# Patient Record
Sex: FEMALE | Race: WHITE | NOT HISPANIC OR LATINO | URBAN - METROPOLITAN AREA
[De-identification: names, ages, dates, MRNs, and addresses within clinical notes are randomized per-mention and may not be internally consistent; named-entity substitution may affect disease eponyms.]

---

## 2023-10-02 ENCOUNTER — OFFICE (OUTPATIENT)
Dept: URBAN - METROPOLITAN AREA CLINIC 12 | Facility: CLINIC | Age: 48
End: 2023-10-02

## 2023-10-02 VITALS
OXYGEN SATURATION: 98 % | HEIGHT: 63 IN | HEART RATE: 120 BPM | SYSTOLIC BLOOD PRESSURE: 156 MMHG | DIASTOLIC BLOOD PRESSURE: 108 MMHG | WEIGHT: 107 LBS

## 2023-10-02 DIAGNOSIS — R15.2 FECAL URGENCY: ICD-10-CM

## 2023-10-02 DIAGNOSIS — R10.13 EPIGASTRIC PAIN: ICD-10-CM

## 2023-10-02 DIAGNOSIS — R63.4 ABNORMAL WEIGHT LOSS: ICD-10-CM

## 2023-10-02 PROCEDURE — 99205 OFFICE O/P NEW HI 60 MIN: CPT | Performed by: INTERNAL MEDICINE

## 2023-10-02 NOTE — SERVICEHPINOTES
Patient is a 48-year-old  woman with history of anxiety ( utilizes medical marijuana gummies since 2020),  prior cholecystectomy,  who presents for evaluation of fecal urgency, and nausea that began May 3rd.  At this time, patient's granddaughter was born, and that is how she remembers when the symptoms began.  Patient has been under some stress, but not anything more than usual.   Her mother-in-law had cancer and passed away a few months ago, and her grandfather is currently passing away in [place].  
gibran leary Patient was admitted to [facility] in mid July, and underwent upper endoscopy and colonoscopy after she had been treated with ciprofloxacin and metronidazole.  Apparently biopsies were all unremarkable, and stool studies as well as cross-sectional imaging was also unremarkable.   Patient's blood work, including thyroid testing, was normal, and she was discharged with Bentyl 4 times a day, and fiber supplementation, which has not helped her.  She subsequently was trialed on pancreatic enzymes empirically starting last week, but this is not given her any significant relief thus far.   
gibran leary Patient does have focal epigastric pain that does not radiate, and is not reproducible on exam.   Patient is now having about 3 bowel movements a day, but this is exacerbated by anything that she eats.  Patient had initially tried Pepto-Bismol without any relief, and loperamide also did not help.  Lomotil  was only temporarily helpful.   At this point patient also feels clammy on her skin, and she has elevated heart rate and blood pressure.  patient does not report any blood in the stool.   Since COVID in May ( she developed COVID  after GI symptoms began), patient reports that her  sense of taste and smell has been altered. 
gibran leary    Patient has been seen at [facility] in 2011, at which time she sigmoidoscopy done, and prior to that she had colonoscopy in 2010 that was suggestive of  colitis, possibly secondary to C difficile.  Subsequent sigmoidoscopy biopsies do not reveal any evidence of colitis

## 2023-10-04 LAB
ANA, IFA RFX 9 MARK MULTIPLEX: ANA BY IFA RFX TITER/PATTERN: NEGATIVE
ANA, IFA RFX 9 MARK MULTIPLEX: PLEASE NOTE: (no result)
C-REACTIVE PROTEIN, QUANT: <1 MG/L
CHROMOGRANIN A: 53.2 NG/ML (ref 0–101.8)
SEDIMENTATION RATE-WESTERGREN: 5 MM/HR (ref 0–40)

## 2023-10-12 LAB
5-HIAA,QUANT.,24 HR URINE: 5-HIAA, URINE, 24HR: < MG/24 HR
5-HIAA,QUANT.,24 HR URINE: 5-HIAA, URINE: <0.1 MG/L

## 2023-10-13 LAB
C DIFFICILE TOXIN GENE NAA: NEGATIVE
CALPROTECTIN, FECAL: 43 UG/G (ref 0–120)
GI PROFILE, STOOL, PCR: ADENOVIRUS F 40/41: NOT DETECTED
GI PROFILE, STOOL, PCR: ASTROVIRUS: NOT DETECTED
GI PROFILE, STOOL, PCR: C DIFFICILE TOXIN A/B: DETECTED
GI PROFILE, STOOL, PCR: CAMPYLOBACTER: NOT DETECTED
GI PROFILE, STOOL, PCR: CRYPTOSPORIDIUM: NOT DETECTED
GI PROFILE, STOOL, PCR: CYCLOSPORA CAYETANENSIS: NOT DETECTED
GI PROFILE, STOOL, PCR: E COLI O157: (no result)
GI PROFILE, STOOL, PCR: ENTAMOEBA HISTOLYTICA: NOT DETECTED
GI PROFILE, STOOL, PCR: ENTEROAGGREGATIVE E COLI: NOT DETECTED
GI PROFILE, STOOL, PCR: ENTEROPATHOGENIC E COLI: NOT DETECTED
GI PROFILE, STOOL, PCR: ENTEROTOXIGENIC E COLI: NOT DETECTED
GI PROFILE, STOOL, PCR: GIARDIA LAMBLIA: NOT DETECTED
GI PROFILE, STOOL, PCR: NOROVIRUS GI/GII: NOT DETECTED
GI PROFILE, STOOL, PCR: PLESIOMONAS SHIGELLOIDES: NOT DETECTED
GI PROFILE, STOOL, PCR: ROTAVIRUS A: NOT DETECTED
GI PROFILE, STOOL, PCR: SALMONELLA: NOT DETECTED
GI PROFILE, STOOL, PCR: SAPOVIRUS: NOT DETECTED
GI PROFILE, STOOL, PCR: SHIGA-TOXIN-PRODUCING E COLI: NOT DETECTED
GI PROFILE, STOOL, PCR: SHIGELLA/ENTEROINVASIVE E COLI: NOT DETECTED
GI PROFILE, STOOL, PCR: VIBRIO CHOLERAE: NOT DETECTED
GI PROFILE, STOOL, PCR: VIBRIO: NOT DETECTED
GI PROFILE, STOOL, PCR: YERSINIA ENTEROCOLITICA: NOT DETECTED
OVA + PARASITE EXAM: (no result)
OVA + PARASITE EXAM: RESULT 1: (no result)
PANCREATIC ELASTASE, FECAL: 291 UG ELAST./G (ref 200–?)

## 2023-10-30 ENCOUNTER — AMBULATORY SURGICAL CENTER (OUTPATIENT)
Dept: URBAN - METROPOLITAN AREA SURGERY 1 | Facility: SURGERY | Age: 48
End: 2023-10-30

## 2023-10-30 ENCOUNTER — AMBULATORY SURGICAL CENTER (OUTPATIENT)
Dept: URBAN - METROPOLITAN AREA SURGERY 1 | Facility: SURGERY | Age: 48
End: 2023-10-30
Payer: COMMERCIAL

## 2023-10-30 ENCOUNTER — OFFICE (OUTPATIENT)
Dept: URBAN - METROPOLITAN AREA PATHOLOGY 12 | Facility: PATHOLOGY | Age: 48
End: 2023-10-30
Payer: COMMERCIAL

## 2023-10-30 VITALS
TEMPERATURE: 97.1 F | OXYGEN SATURATION: 97 % | SYSTOLIC BLOOD PRESSURE: 103 MMHG | OXYGEN SATURATION: 98 % | SYSTOLIC BLOOD PRESSURE: 108 MMHG | RESPIRATION RATE: 18 BRPM | DIASTOLIC BLOOD PRESSURE: 69 MMHG | OXYGEN SATURATION: 100 % | SYSTOLIC BLOOD PRESSURE: 103 MMHG | SYSTOLIC BLOOD PRESSURE: 108 MMHG | SYSTOLIC BLOOD PRESSURE: 91 MMHG | RESPIRATION RATE: 14 BRPM | HEART RATE: 67 BPM | DIASTOLIC BLOOD PRESSURE: 74 MMHG | TEMPERATURE: 97.1 F | HEART RATE: 67 BPM | OXYGEN SATURATION: 100 % | TEMPERATURE: 97.2 F | SYSTOLIC BLOOD PRESSURE: 90 MMHG | HEART RATE: 90 BPM | DIASTOLIC BLOOD PRESSURE: 57 MMHG | HEIGHT: 63 IN | DIASTOLIC BLOOD PRESSURE: 51 MMHG | DIASTOLIC BLOOD PRESSURE: 57 MMHG | WEIGHT: 114 LBS | TEMPERATURE: 97.2 F | RESPIRATION RATE: 19 BRPM | SYSTOLIC BLOOD PRESSURE: 108 MMHG | SYSTOLIC BLOOD PRESSURE: 91 MMHG | OXYGEN SATURATION: 98 % | HEART RATE: 86 BPM | TEMPERATURE: 97.1 F | HEART RATE: 66 BPM | RESPIRATION RATE: 19 BRPM | SYSTOLIC BLOOD PRESSURE: 90 MMHG | DIASTOLIC BLOOD PRESSURE: 69 MMHG | HEART RATE: 86 BPM | DIASTOLIC BLOOD PRESSURE: 51 MMHG | OXYGEN SATURATION: 98 % | HEART RATE: 66 BPM | HEART RATE: 86 BPM | DIASTOLIC BLOOD PRESSURE: 57 MMHG | DIASTOLIC BLOOD PRESSURE: 80 MMHG | SYSTOLIC BLOOD PRESSURE: 103 MMHG | OXYGEN SATURATION: 95 % | TEMPERATURE: 97.2 F | OXYGEN SATURATION: 97 % | HEART RATE: 67 BPM | DIASTOLIC BLOOD PRESSURE: 51 MMHG | SYSTOLIC BLOOD PRESSURE: 91 MMHG | OXYGEN SATURATION: 96 % | OXYGEN SATURATION: 95 % | DIASTOLIC BLOOD PRESSURE: 74 MMHG | OXYGEN SATURATION: 97 % | HEART RATE: 90 BPM | RESPIRATION RATE: 16 BRPM | HEIGHT: 63 IN | WEIGHT: 114 LBS | HEART RATE: 68 BPM | OXYGEN SATURATION: 96 % | WEIGHT: 114 LBS | RESPIRATION RATE: 14 BRPM | HEIGHT: 63 IN | RESPIRATION RATE: 19 BRPM | DIASTOLIC BLOOD PRESSURE: 69 MMHG | HEART RATE: 68 BPM | DIASTOLIC BLOOD PRESSURE: 80 MMHG | RESPIRATION RATE: 18 BRPM | DIASTOLIC BLOOD PRESSURE: 74 MMHG | RESPIRATION RATE: 14 BRPM | OXYGEN SATURATION: 100 % | OXYGEN SATURATION: 95 % | RESPIRATION RATE: 16 BRPM | HEART RATE: 66 BPM | RESPIRATION RATE: 18 BRPM | DIASTOLIC BLOOD PRESSURE: 80 MMHG | HEART RATE: 68 BPM | OXYGEN SATURATION: 96 % | HEART RATE: 90 BPM | RESPIRATION RATE: 16 BRPM | SYSTOLIC BLOOD PRESSURE: 90 MMHG

## 2023-10-30 DIAGNOSIS — K29.50 UNSPECIFIED CHRONIC GASTRITIS WITHOUT BLEEDING: ICD-10-CM

## 2023-10-30 DIAGNOSIS — R10.13 EPIGASTRIC PAIN: ICD-10-CM

## 2023-10-30 DIAGNOSIS — R11.2 NAUSEA WITH VOMITING, UNSPECIFIED: ICD-10-CM

## 2023-10-30 PROCEDURE — 43239 EGD BIOPSY SINGLE/MULTIPLE: CPT | Performed by: INTERNAL MEDICINE

## 2023-10-30 PROCEDURE — 88313 SPECIAL STAINS GROUP 2: CPT | Performed by: PATHOLOGY

## 2023-10-30 PROCEDURE — 88305 TISSUE EXAM BY PATHOLOGIST: CPT | Performed by: PATHOLOGY

## 2023-10-30 PROCEDURE — 88342 IMHCHEM/IMCYTCHM 1ST ANTB: CPT | Performed by: PATHOLOGY

## 2023-11-02 LAB
GASTRO ONE PATHOLOGY: PDF REPORT: (no result)

## 2023-12-01 ENCOUNTER — OFFICE (OUTPATIENT)
Dept: URBAN - METROPOLITAN AREA CLINIC 11 | Facility: CLINIC | Age: 48
End: 2023-12-01

## 2023-12-01 VITALS
OXYGEN SATURATION: 99 % | HEIGHT: 60 IN | DIASTOLIC BLOOD PRESSURE: 85 MMHG | HEART RATE: 96 BPM | SYSTOLIC BLOOD PRESSURE: 128 MMHG | WEIGHT: 117 LBS

## 2023-12-01 DIAGNOSIS — R19.7 DIARRHEA, UNSPECIFIED: ICD-10-CM

## 2023-12-01 DIAGNOSIS — R10.13 EPIGASTRIC PAIN: ICD-10-CM

## 2023-12-01 DIAGNOSIS — A04.72 ENTEROCOLITIS DUE TO CLOSTRIDIUM DIFFICILE, NOT SPECIFIED AS: ICD-10-CM

## 2023-12-01 DIAGNOSIS — R15.2 FECAL URGENCY: ICD-10-CM

## 2023-12-01 PROCEDURE — 99214 OFFICE O/P EST MOD 30 MIN: CPT | Performed by: NURSE PRACTITIONER

## 2023-12-05 LAB — C DIFFICILE TOXINS A+B, EIA: NEGATIVE

## 2024-01-26 ENCOUNTER — OFFICE (OUTPATIENT)
Dept: URBAN - METROPOLITAN AREA CLINIC 11 | Facility: CLINIC | Age: 49
End: 2024-01-26

## 2024-01-26 VITALS
SYSTOLIC BLOOD PRESSURE: 115 MMHG | OXYGEN SATURATION: 98 % | HEIGHT: 63 IN | HEART RATE: 85 BPM | WEIGHT: 117 LBS | DIASTOLIC BLOOD PRESSURE: 81 MMHG

## 2024-01-26 DIAGNOSIS — R11.2 NAUSEA WITH VOMITING, UNSPECIFIED: ICD-10-CM

## 2024-01-26 DIAGNOSIS — R10.13 EPIGASTRIC PAIN: ICD-10-CM

## 2024-01-26 DIAGNOSIS — A04.72 ENTEROCOLITIS DUE TO CLOSTRIDIUM DIFFICILE, NOT SPECIFIED AS: ICD-10-CM

## 2024-01-26 PROCEDURE — 99214 OFFICE O/P EST MOD 30 MIN: CPT | Performed by: INTERNAL MEDICINE

## 2024-02-15 ENCOUNTER — OFFICE (OUTPATIENT)
Dept: URBAN - METROPOLITAN AREA CLINIC 11 | Facility: CLINIC | Age: 49
End: 2024-02-15
Payer: COMMERCIAL

## 2024-02-15 VITALS
HEIGHT: 63 IN | WEIGHT: 115 LBS | DIASTOLIC BLOOD PRESSURE: 93 MMHG | HEART RATE: 83 BPM | OXYGEN SATURATION: 94 % | SYSTOLIC BLOOD PRESSURE: 122 MMHG

## 2024-02-15 DIAGNOSIS — R10.84 GENERALIZED ABDOMINAL PAIN: ICD-10-CM

## 2024-02-15 DIAGNOSIS — K59.00 CONSTIPATION, UNSPECIFIED: ICD-10-CM

## 2024-02-15 DIAGNOSIS — R63.0 ANOREXIA: ICD-10-CM

## 2024-02-15 DIAGNOSIS — F41.9 ANXIETY DISORDER, UNSPECIFIED: ICD-10-CM

## 2024-02-15 PROCEDURE — 99214 OFFICE O/P EST MOD 30 MIN: CPT | Performed by: INTERNAL MEDICINE

## 2024-04-04 ENCOUNTER — OFFICE (OUTPATIENT)
Dept: URBAN - METROPOLITAN AREA CLINIC 11 | Facility: CLINIC | Age: 49
End: 2024-04-04

## 2024-04-04 VITALS
OXYGEN SATURATION: 96 % | HEART RATE: 113 BPM | WEIGHT: 120 LBS | HEIGHT: 63 IN | DIASTOLIC BLOOD PRESSURE: 97 MMHG | SYSTOLIC BLOOD PRESSURE: 137 MMHG

## 2024-04-04 DIAGNOSIS — R11.0 NAUSEA: ICD-10-CM

## 2024-04-04 DIAGNOSIS — R10.84 GENERALIZED ABDOMINAL PAIN: ICD-10-CM

## 2024-04-04 PROCEDURE — 99215 OFFICE O/P EST HI 40 MIN: CPT | Performed by: INTERNAL MEDICINE

## 2025-01-23 ENCOUNTER — OFFICE (OUTPATIENT)
Dept: URBAN - METROPOLITAN AREA CLINIC 11 | Facility: CLINIC | Age: 50
End: 2025-01-23
Payer: COMMERCIAL

## 2025-01-23 VITALS
SYSTOLIC BLOOD PRESSURE: 127 MMHG | HEART RATE: 91 BPM | DIASTOLIC BLOOD PRESSURE: 88 MMHG | HEIGHT: 63 IN | OXYGEN SATURATION: 94 % | WEIGHT: 123 LBS

## 2025-01-23 DIAGNOSIS — F41.9 ANXIETY DISORDER, UNSPECIFIED: ICD-10-CM

## 2025-01-23 DIAGNOSIS — R10.13 EPIGASTRIC PAIN: ICD-10-CM

## 2025-01-23 DIAGNOSIS — K59.00 CONSTIPATION, UNSPECIFIED: ICD-10-CM

## 2025-01-23 DIAGNOSIS — R11.2 NAUSEA WITH VOMITING, UNSPECIFIED: ICD-10-CM

## 2025-01-23 PROCEDURE — 99214 OFFICE O/P EST MOD 30 MIN: CPT | Performed by: NURSE PRACTITIONER

## 2025-01-23 NOTE — SERVICEHPINOTES
Patient is a 49-year-old  woman with history of anxiety ( utilizes medical marijuana gummies since 2020), prior cholecystectomy, who presents for evaluation of fecal urgency, and nausea that began May 3rd. At this time, patient's granddaughter was born, and that is how she remembers when the symptoms began. Patient has been under some stress, but not anything more than usual. Her mother-in-law had cancer and passed away a few months ago, and her grandfather is currently passing away in [place].Patient was admitted to [facility] in mid July, and underwent upper endoscopy and colonoscopy after she had been treated with ciprofloxacin and metronidazole. Apparently biopsies were all unremarkable, and stool studies as well as cross-sectional imaging was also unremarkable. Patient's blood work, including thyroid testing, was normal, and she was discharged with Bentyl 4 times a day, and fiber supplementation, which has not helped her. She subsequently was trialed on pancreatic enzymes empirically starting last week, but this is not given her any significant relief thus far.Patient does have focal epigastric pain that does not radiate, and is not reproducible on exam. Patient is now having about 3 bowel movements a day, but this is exacerbated by anything that she eats. Patient had initially tried Pepto-Bismol without any relief, and loperamide also did not help. Lomotil was only temporarily helpful. At this point patient also feels clammy on her skin, and she has elevated heart rate and blood pressure. patient does not report any blood in the stool. Since COVID in May ( she developed COVID after GI symptoms began), patient reports that her sense of taste and smell has been altered.Patient has been seen at [facility] in 2011, at which time she sigmoidoscopy done, and prior to that she had colonoscopy in 2010 that was suggestive of colitis, possibly secondary to C difficile. Subsequent sigmoidoscopy biopsies do not reveal any evidence of [id number]/1/23: Since her last visit, she had an EGD completed in October with biopsies that were negative for H pylori, showing chronic gastritis. She had stool studies completed that was positive for c diff and she was treated with a course of vancomycin. She reports that she had improvement of her bowel movements after completing the vancomycin and they returned to normal. After Thanksgiving, she began having diarrhea again with 5- 13 bowel movements/day, as well as abdominal cramping and urgency. She denies hematochezia. She is having constant nausea without vomiting and is taking phenergan frequently. She continues to have epigastric pain and complaints of a bubble in the epigastric region occasionally. She has been taking bentyl for the abdominal cramping, which helps relieve her pain, but she has significant memory loss when taking it.1/26/24: Patient reports that overall, since last month she is better and has improved, but she still has a bothersome, focal, cramping and severe upper abdominal pain in the epigastric region and right upper quadrant, that can happen over 10 times a day, with or without food, and without warning. Patient reports that it feels like labor pain, and she can feel dizzy when the pain hits. She wonders whether she could have some sort of blockage. She seems to be having mild constipation at this time. Patient is also not currently having any diarrhea, but requests a stool kit just in case diarrhea recurs in the future, in which case she will be able to submit a stool specimen without needing to come all the way back to the office.2/15/24: Since last visit, patient reports that with the Linzess, the right upper quadrant pain went away, but now she has is more left-sided lower abdominal pain. Patient expresses some frustration after not having received a response from [facility] after she sent a message through the portal.4/4/24: Patient presents for follow-up after recent MRI that was suggestive of an endometrioma. Patient has seen and follows with gynecologist Dr. Cutler, and she will follow-up with him for this issue. Her symptoms continue to be terrible, and she has focal, severe, cramping upper abdominal pain, feels like she has a gas bubble in that area. Patient sometimes wishes that she could throw up to get relief, but though she feels nauseous, she is not vomiting. There are a couple of good days when patient can eat relatively comfortably, but these are few and far between. Patient may go a maximum of 2-3 days without symptoms, before they come back, and a can be severe, and unpredictable. Patient mentions that Metamucil has not helped her with evacuation of stool, and she also feels like she has 900 lb of weight in her abdomen all focused in the upper abdomen. Patient also mentions the sometimes she feels like she is going to give birth to an alien, or or like she has a watermelon in that area. Patient tried Iberogast, but felt like it cause increased cramping, especially when taking a without food. Patient had 1 bout/ self limited episode of rectal bleeding, that has resolved, and no further bleeding at all. patient's only other medications include clonazepam 1 mg t.i.d., and oral contraceptive. 
br
br   1/23/25: The patient, with a history of C. diff infection and a full hysterectomy, presents with recurrent upper abdominal pain and chronic nausea. The pain, described as spasmodic and akin to labor pains, is located in the upper abdomen and radiates to the back. The pain is not constant but occurs in episodes that are severe enough to take the patient's breath away. The patient reports that the pain episodes are unpredictable and can occur at any time, causing significant disruption to daily activities.The patient also experiences chronic nausea, which is present all day and can wake her from sleep. The nausea is often accompanied by vomiting of bile. Despite these symptoms, the patient reports a recent weight gain, which she views as a positive development.The patient's bowel habits have also been affected. She reports alternating constipation and diarrhea, with constipation being the more prevalent issue. The patient has been taking Omeprazole for reflux, but it has not alleviated the pain.The patient's symptoms began over a year ago, before her hysterectomy, and have intensified recently. The patient expresses a strong desire for further investigation and resolution of her symptoms, as they significantly impact her quality of life.
Following her last visit in April of 2024 she had a Doppler ultrasound completed that was normal, she also had a CT of the abdomen and pelvis completed at an ER visit in July for vomiting and diarrhea that showed a stable hyperdense mass described as endometrioma and her other acute abnormalities.  She had an EGD completed in 2023 that was normal.

## 2025-01-23 NOTE — SERVICENOTES
Pain in the same location that she has previously reported, has significantly intensified over the last few months, to the point that she is unable to drive, due to not knowing when the pain will start.  She was adamant that she would not be satisfied until she had an EGD to further investigate.